# Patient Record
(demographics unavailable — no encounter records)

---

## 2025-03-26 NOTE — HISTORY OF PRESENT ILLNESS
[FreeTextEntry1] :  [facility] Cardiology ([place] Office)   New Patient Encounter Note  67 year old gentleman with HTN, and otherwise good health -- found to have incidental coronary calcifications on CT calcification.  No chest pain, shortness of breath, palpitations, lower extremity swelling, orthopnea, PND. Patient has been otherwise doing well and maintaining good exercise capacity without any noticeable decline. METS > 10.   CARDIOVASCULAR EXAM: Vital Signs Reviewed (see below) Gen: NAD Cardiac: RRR, nl s1, s2. Normal PMI. 2/6 systolic murmur, rubs, or gallops. Lungs:  Good air flow. No wheezing or stridor. Ext: Warm. 2+ radial pulses bilaterally. No lower extremity edema.  CARDIOVASCULAR STUDIES REVIEWED: ECG 03/26/25: NSR

## 2025-03-26 NOTE — DISCUSSION/SUMMARY
[FreeTextEntry1] : 67 M with asymptomatic coronary calcifications on CT warrants statin therapy. Has HTN, and 2/6 systolic heart murmur which warrants evaluation of hypertensive/pericardial heart disease with TTE.   Plan:   - A1c, CBC, CMP, lipid panel, TSH  - TTE  - Continue rosuvastatin 10 mg daily  - Continue metformin  mg every evening with dinner  - Continue atenolol 25 mg every day (long-standing)  - Continue amlo 5 mg every evening  RTC: 6-8 weeks.   Appreciate the opportunity to participate in the care of Mr. SEGURA. Strict return precautions were provided to patient for symptoms that arise or worsen.  Please do not hesitate to reach out with any questions, concerns, or changes in clinical status.   Jet Conroy MD, FACC [facility] | Interventional Cardiology

## 2025-03-26 NOTE — HISTORY OF PRESENT ILLNESS
[FreeTextEntry1] :  [facility] Cardiology ([place] Office)   New Patient Encounter Note  67 year old gentleman with HTN, and otherwise good health -- found to have incidental coronary calcifications on CT calcification.  No chest pain, shortness of breath, palpitations, lower extremity swelling, orthopnea, PND. Patient has been otherwise doing well and maintaining good exercise capacity without any noticeable decline. METS > 10.   CARDIOVASCULAR EXAM: Vital Signs Reviewed (see below) Gen: NAD Cardiac: RRR, nl s1, s2. Normal PMI. 2/6 systolic murmur, rubs, or gallops. Lungs:  Good air flow. No wheezing or stridor. Ext: Warm. 2+ radial pulses bilaterally. No lower extremity edema.  CARDIOVASCULAR STUDIES REVIEWED: ECG 03/26/25: NSR

## 2025-03-26 NOTE — DISCUSSION/SUMMARY
[FreeTextEntry1] : 67 M with asymptomatic coronary calcifications on CT warrants statin therapy. Has HTN, and 2/6 systolic heart murmur which warrants evaluation of hypertensive/pericardial heart disease with TTE.   Plan:   - A1c, CBC, CMP, lipid panel, TSH  - TTE  - Continue rosuvastatin 10 mg daily  - Continue metformin  mg every evening with dinner  - Continue atenolol 25 mg every day (long-standing)  - Continue amlo 5 mg every evening  RTC: 6-8 weeks.   Appreciate the opportunity to participate in the care of Mr. SEGURA. Strict return precautions were provided to patient for symptoms that arise or worsen.  Please do not hesitate to reach out with any questions, concerns, or changes in clinical status.   Jet Conroy MD, FACC [facility] | Interventional Cardiology

## 2025-04-25 NOTE — DISCUSSION/SUMMARY
[FreeTextEntry1] : 67 M with asymptomatic coronary calcifications on CT warrants statin therapy. Has HTN, and 2/6 systolic heart murmur which warrants evaluation of hypertensive/pericardial heart disease with TTE. TTE excellent. Would like to switch triamterene-hctz to a less intense antihypertensive like an ARB.   Plan:   - Continue rosuvastatin 10 mg daily + CoQ10   - Continue metformin  mg every evening with dinner  - Continue atenolol 25 mg every day (long-standing)  - Continue amlo 5 mg every evening  - Stop triamterene - HCTZ   - Stop Klor-Con  - Start valsartan 160 mg daily   RTC: 14 day for BP / labs   Appreciate the opportunity to participate in the care of Mr. SEGURA. Strict return precautions were provided to patient for symptoms that arise or worsen.  Please do not hesitate to reach out with any questions, concerns, or changes in clinical status.   Jet Conroy MD, FACC [facility] | Interventional Cardiology

## 2025-04-25 NOTE — HISTORY OF PRESENT ILLNESS
[FreeTextEntry1] :  [facility] Cardiology ([place] Office)   Return Encounter Note  67 year old gentleman with HTN, and otherwise good health -- found to have incidental coronary calcifications on CT calcification.  No chest pain, shortness of breath, palpitations, lower extremity swelling, orthopnea, PND. Patient has been otherwise doing well and maintaining good exercise capacity without any noticeable decline. METS > 10.   CARDIOVASCULAR EXAM: Vital Signs Reviewed (see below) Gen: NAD Cardiac: RRR, nl s1, s2. Normal PMI. 2/6 systolic murmur, rubs, or gallops. Lungs:  Good air flow. No wheezing or stridor. Ext: Warm. 2+ radial pulses bilaterally. No lower extremity edema.  CARDIOVASCULAR STUDIES REVIEWED: ECG 03/26/25: NSR  TTE 04/25/25  1. Left ventricular systolic function is normal with an ejection fraction visually estimated at 60 to 65 %. 2. Normal left ventricular diastolic function. 3. Normal right ventricular cavity size and normal right ventricular systolic function. 4. Normal left and right atrial size. 5. Trace to mild mitral regurgitation. 6. Trace to mild aortic regurgitation. 7. Trace tricuspid regurgitation. Pulmonary artery systolic pressure could not be estimated. 8. No pericardial effusion seen. 9. No prior echocardiogram is available for comparison.

## 2025-05-08 NOTE — DISCUSSION/SUMMARY
[FreeTextEntry1] : 67 M with asymptomatic coronary calcifications on CT warrants statin therapy. Has HTN, and 2/6 systolic heart murmur which warrants evaluation of hypertensive/pericardial heart disease with TTE. TTE excellent. BP will be optimized with stopping atenolol given that it is asymptomatically low right now.   Plan:   - Continue rosuvastatin 10 mg daily + CoQ10   - Continue metformin  mg every evening with dinner  - Atenolol 12.5 mg daily x 7 days, then DC. Check BP at home. Notify us if SBP > 135.   - Continue amlo 5 mg every evening  - Stop triamterene - HCTZ   - Stop Klor-Con  - Start valsartan 160 mg daily   RTC: 6 months with labs   Appreciate the opportunity to participate in the care of Mr. SEGURA. Strict return precautions were provided to patient for symptoms that arise or worsen.  Please do not hesitate to reach out with any questions, concerns, or changes in clinical status.   Jet Conroy MD, FACC [facility] | Interventional Cardiology

## 2025-05-08 NOTE — HISTORY OF PRESENT ILLNESS
[FreeTextEntry1] :  [facility] Cardiology ([place] Office)   Return Encounter Note  67 year old gentleman with HTN, and otherwise good health -- found to have incidental coronary calcifications on CT calcification.  No chest pain, shortness of breath, palpitations, lower extremity swelling, orthopnea, PND. Patient has been otherwise doing well and maintaining good exercise capacity without any noticeable decline. METS > 10.   05/08/25: BP supratherapeutically controlled with SBP 95. Cut atenlolol in half , for a week then discontinue. Continue valsartan and amlo. Kidneys happy with stopping triamterene-HCTZ. Feeling great.   CARDIOVASCULAR EXAM: Vital Signs Reviewed (see below) Gen: NAD Cardiac: RRR, nl s1, s2. Normal PMI. 2/6 systolic murmur, rubs, or gallops. Lungs:  Good air flow. No wheezing or stridor. Ext: Warm. 2+ radial pulses bilaterally. No lower extremity edema.  CARDIOVASCULAR STUDIES REVIEWED:  LAB 05/08/25: A1c 6  ECG 03/26/25: NSR  TTE 04/25/25  1. Left ventricular systolic function is normal with an ejection fraction visually estimated at 60 to 65 %. 2. Normal left ventricular diastolic function. 3. Normal right ventricular cavity size and normal right ventricular systolic function. 4. Normal left and right atrial size. 5. Trace to mild mitral regurgitation. 6. Trace to mild aortic regurgitation. 7. Trace tricuspid regurgitation. Pulmonary artery systolic pressure could not be estimated. 8. No pericardial effusion seen. 9. No prior echocardiogram is available for comparison.

## 2025-07-16 NOTE — HISTORY OF PRESENT ILLNESS
[FreeTextEntry1] :  [facility] Cardiology ([facility])   Return Encounter Note  67 year old gentleman with HTN, and otherwise good health with incidental coronary calcifications presenting for management of HTN, palpitations, and medication reconciliation.  METS > 10.   Feels palpitations and rapid heart rate -- reminds him of time he had hashimoto's thyroiditis that got better with methimazole. Labs kindly ordered by his new PCP Dr. Davey, whom he is very happy to be seeing. TFTs / thyroid Ab studies ordered. Some presyncopal episodes.    CARDIOVASCULAR EXAM: Vital Signs Reviewed (see below) Gen: NAD Cardiac: RRR, nl s1, s2. Normal PMI. 2/6 systolic murmur, rubs, or gallops. Lungs:  Good air flow. No wheezing or stridor. Ext: Warm. 2+ radial pulses bilaterally. No lower extremity edema.  CARDIOVASCULAR STUDIES REVIEWED:  LAB 05/08/25: A1c 6  ECG 03/26/25: NSR  TTE 04/25/25  1. Left ventricular systolic function is normal with an ejection fraction visually estimated at 60 to 65 %. 2. Normal left ventricular diastolic function. 3. Normal right ventricular cavity size and normal right ventricular systolic function. 4. Normal left and right atrial size. 5. Trace to mild mitral regurgitation. 6. Trace to mild aortic regurgitation. 7. Trace tricuspid regurgitation. Pulmonary artery systolic pressure could not be estimated. 8. No pericardial effusion seen. 9. No prior echocardiogram is available for comparison.

## 2025-07-16 NOTE — DISCUSSION/SUMMARY
[FreeTextEntry1] : 67 M with asymptomatic coronary calcifications on CT warrants statin therapy. Has HTN, and 2/6 systolic heart murmur which warrants evaluation of hypertensive/pericardial heart disease with TTE. TTE excellent.  Some rapid heart rate / palpitations; maybe in setting of thyroid dysfunction. 7 day Lili placed on today's visit.   Plan:   - Continue rosuvastatin 10 mg daily + CoQ10   - Continue metformin  mg every evening with dinner   - Cut amlo to 2.5 mg daily --> after 3-4 days discontinue    - Continue valsartan 160 mg daily   - Lili 7 day monitor non live on this visit   RTC: 3 weeks RPA   Appreciate the opportunity to participate in the care of Mr. SEGURA. Strict return precautions were provided to patient for symptoms that arise or worsen.  Please do not hesitate to reach out with any questions, concerns, or changes in clinical status.   Jet Conroy MD, FACC [facility] | Interventional Cardiology